# Patient Record
Sex: FEMALE | Race: WHITE | Employment: OTHER | URBAN - METROPOLITAN AREA
[De-identification: names, ages, dates, MRNs, and addresses within clinical notes are randomized per-mention and may not be internally consistent; named-entity substitution may affect disease eponyms.]

---

## 2021-04-08 DIAGNOSIS — Z23 ENCOUNTER FOR IMMUNIZATION: ICD-10-CM

## 2021-11-29 ENCOUNTER — OFFICE VISIT (OUTPATIENT)
Dept: PHYSICAL THERAPY | Facility: CLINIC | Age: 66
End: 2021-11-29
Payer: MEDICARE

## 2021-11-29 DIAGNOSIS — M17.12 LOCALIZED OSTEOARTHRITIS OF LEFT KNEE: Primary | ICD-10-CM

## 2021-11-29 PROCEDURE — 97161 PT EVAL LOW COMPLEX 20 MIN: CPT | Performed by: PHYSICAL THERAPIST

## 2021-12-13 ENCOUNTER — OFFICE VISIT (OUTPATIENT)
Dept: PHYSICAL THERAPY | Facility: CLINIC | Age: 66
End: 2021-12-13
Payer: MEDICARE

## 2021-12-13 DIAGNOSIS — M17.12 LOCALIZED OSTEOARTHRITIS OF LEFT KNEE: Primary | ICD-10-CM

## 2021-12-13 PROCEDURE — 97110 THERAPEUTIC EXERCISES: CPT | Performed by: PHYSICAL THERAPIST

## 2021-12-13 PROCEDURE — 97140 MANUAL THERAPY 1/> REGIONS: CPT | Performed by: PHYSICAL THERAPIST

## 2022-03-02 ENCOUNTER — OFFICE VISIT (OUTPATIENT)
Dept: PHYSICAL THERAPY | Facility: CLINIC | Age: 67
End: 2022-03-02
Payer: MEDICARE

## 2022-03-02 DIAGNOSIS — S76.312D HAMSTRING STRAIN, LEFT, SUBSEQUENT ENCOUNTER: Primary | ICD-10-CM

## 2022-03-02 PROCEDURE — 97140 MANUAL THERAPY 1/> REGIONS: CPT | Performed by: PHYSICAL THERAPIST

## 2022-03-02 PROCEDURE — 97161 PT EVAL LOW COMPLEX 20 MIN: CPT | Performed by: PHYSICAL THERAPIST

## 2022-03-02 NOTE — PROGRESS NOTES
PT Evaluation     Today's date: 3/2/2022  Patient name: Lakesha Julian  : 1955  MRN: [id number]  Referring provider: Roetta Fabry, PT  Dx:   Encounter Diagnosis     ICD-10-CM    1  Hamstring strain, left, subsequent encounter  S76 640D                   Assessment  Assessment details: Lakesha Julian is a 75 y o  female presenting to outpatient physical therapy at [place] [address] with complaints of L posterior knee pain  She presents with decreased endrange L knee ext ROM, decreased L H/S > calf flexibility, decreased tolerance to activity and decreased functional mobility due to a L hamstring strain that limits her from running  She is getting ready for a half marathon in [place] on 22  She would benefit from skilled PT services in order to address these deficits and reach maximum level of function with pain free running  She was self referred via direct access  She should only need a few PT sessions to resolve symptoms  Thank you for the referral!  Impairments: abnormal or restricted ROM, activity intolerance, impaired physical strength, lacks appropriate home exercise program and pain with function  Barriers to therapy: None  Understanding of Dx/Px/POC: excellent  Goals  ST  Independent with HEP in 2 weeks  2  Increase L H/S and gastroc flexibility to WNL in 3 weeks     LT  Achieve FOTO score of 85/100 in 4 weeks   2    Able to run 8 miles without L knee pain in 4 weeks      Plan  Patient would benefit from: skilled PT and PT eval  Planned modality interventions: cryotherapy  Other planned modality interventions: laser  Planned therapy interventions: ADL retraining, flexibility, functional ROM exercises, home exercise program, joint mobilization, manual therapy, strengthening, stretching, therapeutic activities and therapeutic exercise  Frequency: 2x week  Duration in weeks: 4  Plan of Care beginning date: 3/2/2022  Plan of Care expiration date: 2022  Treatment plan discussed with: patient        Subjective Evaluation    History of Present Illness  Mechanism of injury: Pt reports having anterior L anterior knee pain that started in 2021  She had PT 2x and pain resolved  She normally runs 5 days/week between 3-8 miles  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  Added speed and hill work in on 22 as she was getting ready for a half marathon on 22 in [facility] [place] [facility]   2 days later had severe posterior knee pain  Tried running again on 22 with some pain, but OK the following day  Wants to avoid being limited in her running going forward  Not a recurrent problem   Quality of life: excellent    Pain  Current pain ratin  At best pain ratin  At worst pain ratin  Quality: sharp and tight  Relieving factors: rest  Aggravating factors: running  Progression: worsening    Social Support  Steps to enter house: yes  Stairs in house: yes   Lives in: [facility]  Lives with: spouse    Employment status: not working    Diagnostic Tests  No diagnostic tests performed  Treatments  Previous treatment: physical therapy  Patient Goals  Patient goals for therapy: return to sport/leisure activities, decreased pain, increased motion and increased strength          Objective     Observations   Left Knee   Negative for effusion  Palpation     Additional Palpation Details  Mod tightness L H/S, min L calf, none L rectus femoris  Tenderness   Left Knee   Tenderness in the lateral joint line and medial joint line  No tenderness in the fibular head, ITB, patellar tendon and quadriceps tendon       Neurological Testing     Sensation     Knee   Left Knee   Intact: light touch    Right Knee   Intact: light touch     Active Range of Motion   Left Hip   Normal active range of motion    Right Hip   Normal active range of motion  Left Knee   Flexion: WFL  Extension: -6 degrees   Extensor la degrees     Right Knee   Normal active range of motion    Passive Range of Motion Left Knee   Normal passive range of motion    Right Knee   Normal passive range of motion    Mobility   Patellar Mobility:   Left Knee   WFL: lateral, superior and inferior  Hypomobile: left medial    Strength/Myotome Testing     Left Hip   Normal muscle strength    Right Hip   Normal muscle strength    Left Knee   Flexion: 5  Extension: 4+    Right Knee   Normal strength    Tests     Left Knee   Negative Apley's compression, lateral Rg, medial Rg, valgus stress test at 0 degrees and varus stress test at 0 degrees  Ambulation     Observational Gait   Gait: within functional limits     General Comments:      Knee Comments  Excellent B LE balance  POC EXPIRES On:  4/1/22  PRECAUTIONS:  None  CO-MORBIDITES:  None  PERSONAL FACTORS:  Runs 5 days/week 3-8 miles/day  Half marathon on 4/30/22        Manuals HEP 3/2           Graston L distal H/S - upper calf prone  8'           L H/S stretching   2'                                     Neuro Re-Ed                                                                                                Ther Ex    Strap L H/S stretch supine 3/2 20" 3                                                                                                      Ther Activity                              Gait Training                              Modalities

## 2022-03-02 NOTE — LETTER
2022    Priti Peck MD  [address] [address]    Patient: Bernadine Pennington   YOB: 1955   Date of Visit: 3/2/2022     Encounter Diagnosis     ICD-10-CM    1  Hamstring strain, left, subsequent encounter  G04 [id number]        Dear Dr Ascencio Arms: Thank you for your recent referral of Preeti   Please review the attached evaluation summary from Preeti's recent visit  Please verify that you agree with the plan of care by signing the attached order  If you have any questions or concerns, please do not hesitate to call  I sincerely appreciate the opportunity to share in the care of one of your patients and hope to have another opportunity to work with you in the near future  Sincerely,    Shobha Loza, PT      Referring Provider:      I certify that I have read the below Plan of Care and certify the need for these services furnished under this plan of treatment while under my care  Priti Peck MD  [address] [facility] [address]  Via Fax: [phone]          PT Evaluation     Today's date: 3/2/2022  Patient name: Bernadine Pennington  : 1955  MRN: [id number]  Referring provider: Caio Zamora, PT  Dx:   Encounter Diagnosis     ICD-10-CM    1  Hamstring strain, left, subsequent encounter  S76 312D                   Assessment  Assessment details: Bernadine Pennington is a 75 y o  female presenting to outpatient physical therapy at [place] [address] with complaints of L posterior knee pain  She presents with decreased endrange L knee ext ROM, decreased L H/S > calf flexibility, decreased tolerance to activity and decreased functional mobility due to a L hamstring strain that limits her from running  She is getting ready for a half marathon in [place] on 22  She would benefit from skilled PT services in order to address these deficits and reach maximum level of function with pain free running  She was self referred via direct access    She should only need a few PT sessions to resolve symptoms  Thank you for the referral!  Impairments: abnormal or restricted ROM, activity intolerance, impaired physical strength, lacks appropriate home exercise program and pain with function  Barriers to therapy: None  Understanding of Dx/Px/POC: excellent  Goals  ST  Independent with HEP in 2 weeks  2  Increase L H/S and gastroc flexibility to WNL in 3 weeks     LT  Achieve FOTO score of 85/100 in 4 weeks   2  Able to run 8 miles without L knee pain in 4 weeks      Plan  Patient would benefit from: skilled PT and PT eval  Planned modality interventions: cryotherapy  Other planned modality interventions: laser  Planned therapy interventions: ADL retraining, flexibility, functional ROM exercises, home exercise program, joint mobilization, manual therapy, strengthening, stretching, therapeutic activities and therapeutic exercise  Frequency: 2x week  Duration in weeks: 4  Plan of Care beginning date: 3/2/2022  Plan of Care expiration date: 2022  Treatment plan discussed with: patient        Subjective Evaluation    History of Present Illness  Mechanism of injury: Pt reports having anterior L anterior knee pain that started in 2021  She had PT 2x and pain resolved  She normally runs 5 days/week between 3-8 miles  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  Added speed and hill work in on 22 as she was getting ready for a half marathon on 22 in [place]   2 days later had severe posterior knee pain  Tried running again on 22 with some pain, but OK the following day  Wants to avoid being limited in her running going forward              Not a recurrent problem   Quality of life: excellent    Pain  Current pain ratin  At best pain ratin  At worst pain ratin  Quality: sharp and tight  Relieving factors: rest  Aggravating factors: running  Progression: worsening    Social Support  Steps to enter house: yes  Stairs in house: yes   Lives in: one-story house  Lives with: spouse    Employment status: not working    Diagnostic Tests  No diagnostic tests performed  Treatments  Previous treatment: physical therapy  Patient Goals  Patient goals for therapy: return to sport/leisure activities, decreased pain, increased motion and increased strength          Objective     Observations   Left Knee   Negative for effusion  Palpation     Additional Palpation Details  Mod tightness L H/S, min L calf, none L rectus femoris  Tenderness   Left Knee   Tenderness in the lateral joint line and medial joint line  No tenderness in the fibular head, ITB, patellar tendon and quadriceps tendon  Neurological Testing     Sensation     Knee   Left Knee   Intact: light touch    Right Knee   Intact: light touch     Active Range of Motion   Left Hip   Normal active range of motion    Right Hip   Normal active range of motion  Left Knee   Flexion: WFL  Extension: -6 degrees   Extensor la degrees     Right Knee   Normal active range of motion    Passive Range of Motion   Left Knee   Normal passive range of motion    Right Knee   Normal passive range of motion    Mobility   Patellar Mobility:   Left Knee   WFL: lateral, superior and inferior  Hypomobile: left medial    Strength/Myotome Testing     Left Hip   Normal muscle strength    Right Hip   Normal muscle strength    Left Knee   Flexion: 5  Extension: 4+    Right Knee   Normal strength    Tests     Left Knee   Negative Apley's compression, lateral Rg, medial Rg, valgus stress test at 0 degrees and varus stress test at 0 degrees  Ambulation     Observational Gait   Gait: within functional limits     General Comments:      Knee Comments  Excellent B LE balance  POC EXPIRES On:  22  PRECAUTIONS:  None  CO-MORBIDITES:  None  PERSONAL FACTORS:  Runs 5 days/week 3-8 miles/day  Half marathon on 22        Manuals HEP 3/2           Graston L distal H/S - upper calf prone  8'           L H/S stretching   2'                                     Neuro Re-Ed                                                                                                Ther Ex    Christina L H/S stretch supine 3/2 20" 3                                                                                                      Ther Activity                              Gait Training                              Modalities

## 2022-03-07 ENCOUNTER — OFFICE VISIT (OUTPATIENT)
Dept: PHYSICAL THERAPY | Facility: CLINIC | Age: 67
End: 2022-03-07
Payer: MEDICARE

## 2022-03-07 DIAGNOSIS — S76.312D HAMSTRING STRAIN, LEFT, SUBSEQUENT ENCOUNTER: Primary | ICD-10-CM

## 2022-03-07 PROCEDURE — 97140 MANUAL THERAPY 1/> REGIONS: CPT | Performed by: PHYSICAL THERAPIST

## 2022-03-07 NOTE — PROGRESS NOTES
Daily Note     Today's date: 3/7/2022  Patient name: Uzma Gonzalez  : 1955  MRN: [id number]  Referring provider: Gail Lance, PT  Dx:   Encounter Diagnosis     ICD-10-CM    1  Hamstring strain, left, subsequent encounter  S76 023D                   Subjective:  Pt reports being able to run 5 25 miles after last PT session, but then L posterior knee pain started  Still sore  Objective:  See treatment diary below      Assessment:  Pt presented to outpatient physical therapy at [place] [address] with complaints of L posterior knee pain  She presented with decreased endrange L knee ext ROM, decreased L H/S > calf flexibility, decreased tolerance to activity and decreased functional mobility due to a L hamstring strain that limits her from running  She is getting ready for a half marathon in [place] on 22  She will continue to benefit from skilled PT services in order to address these deficits and reach maximum level of function with pain free running  She was self referred via direct access  She should only need a few PT sessions to resolve symptoms  Much less tightness in upper L calf post tx today  Plan:  Add laser on 3/11/22  POC EXPIRES On:  22  PRECAUTIONS:  None  CO-MORBIDITES:  None  PERSONAL FACTORS:  Runs 5 days/week 3-8 miles/day  Half marathon on 22        Manuals HEP 3/2 3/7          Graston L distal H/S - upper calf prone  8' 15'          L H/S stretching   2' 5'                                    Neuro Re-Ed                                                                                                Ther Ex    Strap L H/S stretch supine 3/2 20" 3 20" 5                                                                                                     Ther Activity                              Gait Training                              Modalities

## 2022-03-11 ENCOUNTER — OFFICE VISIT (OUTPATIENT)
Dept: PHYSICAL THERAPY | Facility: CLINIC | Age: 67
End: 2022-03-11
Payer: MEDICARE

## 2022-03-11 DIAGNOSIS — S76.312D HAMSTRING STRAIN, LEFT, SUBSEQUENT ENCOUNTER: Primary | ICD-10-CM

## 2022-03-11 PROCEDURE — 97140 MANUAL THERAPY 1/> REGIONS: CPT | Performed by: PHYSICAL THERAPIST

## 2022-03-17 ENCOUNTER — TELEPHONE (OUTPATIENT)
Dept: OBGYN CLINIC | Facility: OTHER | Age: 67
End: 2022-03-17

## 2023-01-24 ENCOUNTER — TELEPHONE (OUTPATIENT)
Dept: OBGYN CLINIC | Facility: HOSPITAL | Age: 68
End: 2023-01-24

## 2023-02-13 ENCOUNTER — OFFICE VISIT (OUTPATIENT)
Dept: OBGYN CLINIC | Facility: CLINIC | Age: 68
End: 2023-02-13

## 2023-02-13 ENCOUNTER — APPOINTMENT (OUTPATIENT)
Dept: RADIOLOGY | Facility: CLINIC | Age: 68
End: 2023-02-13

## 2023-02-13 VITALS
DIASTOLIC BLOOD PRESSURE: 67 MMHG | WEIGHT: 120 LBS | BODY MASS INDEX: 21.26 KG/M2 | HEART RATE: 70 BPM | HEIGHT: 63 IN | SYSTOLIC BLOOD PRESSURE: 117 MMHG

## 2023-02-13 DIAGNOSIS — M79.641 RIGHT HAND PAIN: ICD-10-CM

## 2023-02-13 DIAGNOSIS — M65.311 TRIGGER FINGER OF RIGHT THUMB: Primary | ICD-10-CM

## 2023-02-13 RX ORDER — MULTIVIT-MIN/IRON/FOLIC ACID/K 18-600-40
CAPSULE ORAL
COMMUNITY
Start: 2021-11-01

## 2023-02-13 RX ORDER — CHLORAL HYDRATE 500 MG
CAPSULE ORAL EVERY 24 HOURS
COMMUNITY

## 2023-02-13 RX ORDER — TURMERIC ROOT EXTRACT 500 MG
TABLET ORAL
COMMUNITY

## 2023-02-13 NOTE — PROGRESS NOTES
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 OA  Right Thumb Trigger    Repeat X-ray next visit: None    No follow-ups on file  Patient Instructions   Trigger finger (stenosing flexor tenosynovitis) is a catching or clicking of the finger (triggering) due to the formation of a soft tissue knot in the tendinous cable that moves your finger  When this knot of the tendon sheath passes through the A1 pulley at the bottom palmar side of your hand it becomes stock momentarily resulting in clicking or locking of the finger  There is no agreed upon cause for the formation of the knot but risk factors do include repetitive overuse and possibly prior trauma  Treatment involves avoidance of activities that trigger the finger, splinting with slight flexion at the knuckle (MCP joint) for 3-6 weeks or tessy taping  Oral anti-inflammatories also can help reduce swelling and triggering and are generally used for 2-4 weeks  If there is no relief with conservative measures or if there is severe locking then we will consider steroid injection once every 6 weeks for a maximum of 3 injections if there is no relief by 50%  Many patients, however, have great relief with only one injection and many (50%) have relief even up to 1 year  Surgery is considered if no relief after injection  (EDA Huertas 2018)  Educated risks of mixing NSAIDS ( (non-steroidal anti-inflammatory pills including advil, ibuprofen, motrin, meloxicam, celecoxib, aleve, naproxen, and aspirin containing products) with each other or with steroids (such as prednisone, medrol)   Explained risks of mixing these medications including stomach ulcer, severe internal bleeding, and kidney failure  Instructed not to take NSAIDS if have history of stomach ulcers, kidney issues, or uncontrolled hypertension  Instructed patient to use only one brand as prescribed  For naproxen, a maximum of 500 mg per dose every 12 hours and no more than two doses or 1,000mg per day  For Ibuprofen, a maximum of 800 mg per dose every 6 hours but no more than 3 doses or 2,400 mg per day  Never take these medications together  Never take these medications the same day  For severe pain and only if you have no liver problems, you may add Tylenol (also known as acetaminophen) maximum of 1,000  Mg per dose every 6 hours but no more 3 doses or 3,000 mg per day  Patient expressed understanding and agreed to plan                     __________________________________________________________________________    HISTORY OF PRESENT ILLNESS:  Evaluation of bilateral hand and wrist pain worse in the left hand however patient had a concern about a possible contraction of her right hand thumb which lasted less than a day where patient's thumb was flexed and she could not extend it  She was seen by her primary care physician who recommended evaluation for hand due to concerns over the contracture  Today she tells me that her pain overall is mild intensity achy sore tolerable worse with gardening  Denies any injury  Thumb triggered about 3 weeks ago  None since and none before  No pain of thumb currently  Points to 1st cmc as source of pain for her hands bilateral          Review of Systems      Following history reviewed and update:    No past medical history on file  No past surgical history on file  Social History   Social History     Substance and Sexual Activity   Alcohol Use None     Social History     Substance and Sexual Activity   Drug Use Not on file     Social History     Tobacco Use   Smoking Status Not on file   Smokeless Tobacco Not on file     No family history on file    No Known Allergies       Physical Exam  /67 (BP Location: Left arm, Patient Position: Sitting, Cuff Size: Adult)   Pulse 70   Ht 5' 3" (1 6 m)   Wt 54 4 kg (120 lb)   BMI 21 26 kg/m²     Constitutional:  see vital signs  Gen: well-developed, normocephalic/atraumatic, well-groomed  Eyes: No inflammation or discharge of conjunctiva or lids; sclera clear   Pharynx: no inflammation, lesion, or mass of lips  Neck: supple, no masses, non-distended  MSK: no inflammation, lesion, mass, or clubbing of nails and digits except for other than mentioned below  SKIN: no visible rashes or skin lesions  Pulmonary/Chest: Effort normal  No respiratory distress     NEURO: cranial nerves grossly intact  PSYCH:  Alert and oriented to person, place, and time; recent and remote memory intact; mood normal, no depression, anxiety, or agitation, judgment and insight good and intact     Ortho Exam  Left Elbow:  rom full  nontender  no laxity of joint  Cubital tunnel Tinel's test:  Distal Biceps Hook test:    Left Wrist  rom full  nontender  no laxity of joint; druj stable  Carpal tunnel compression test:  Phalen's test:  Tinel's carpal tunnel test:    Left Hand  no erythema  Swelling: none  Tenderness: : +tenderness left thumb 1st cmc  rom fingers mcp, pip, dip intact without pain  No digital scissoring or deviation of fingers  no extensor lag  no rotation of fingers  no joint laxity  strenght flexion and extension mcp, pip, dip 5/5  sensation intact  capillary refill intact   Froment sign:  normal  OK sign:  Normal  Thumb extension:  5/5    Right Elbow:  rom full  nontender  no laxity of joint  Cubital tunnel Tinel's test:  Distal Biceps Hook test:    Right Wrist  rom full  nontender  no laxity of joint; druj stable  Carpal tunnel compression test:  Phalen's test:  Tinel's carpal tunnel test:    Right Hand  no erythema  Swelling: none  Tenderness: none  rom fingers mcp, pip, dip intact without pain  No digital scissoring or deviation of fingers  no extensor lag  no rotation of fingers  no joint laxity  strenght flexion and extension mcp, pip, dip 5/5  sensation intact  capillary refill intact   Froment sign:  normal  OK sign:  Normal  Thumb extension:  5/5    __________________________________________________________________________  Procedures

## 2023-08-09 NOTE — PROGRESS NOTES
[address] [place] [address] REFERRAL      Date: 8/9/2023    Patient name: Anjelica Olson    YOB: 2001    Estimated Date of Delivery: 10/20/23      /74 (BP Location: Right arm, Patient Position: Sitting, Cuff Size: Standard)   Pulse 82   Ht 5' 7" (1.702 m)   Wt 95.6 kg (210 lb 12.8 oz)   LMP 01/13/2023 (Exact Date)   BMI 33.02 kg/m²         Thank you,  Luigi Ambrocio, [address] [place] [address] locations:   1. [facility]       [address] [facility]       [address]       Phone: [phone]       Fax: [phone]     2.  [address] [facility]       [address]       Phone: [phone]       Fax: [phone] Daily Note + D/C    Today's date: 3/11/2022  Patient name: Jatinder Armsa  : 1955  MRN: [id number]  Referring provider: Alvie Dubin, PT  Dx:   Encounter Diagnosis     ICD-10-CM    1  Hamstring strain, left, subsequent encounter  S76 312D      Addendum 3/31/22:  Pt D/C to HEP  Most goals met  Subjective:  Pt reports being able to run pain free yesterday  Very excited  Objective:  See treatment diary below      Assessment:  Pt presented to outpatient physical therapy at [facility] with complaints of L posterior knee pain  She presented with decreased endrange L knee ext ROM, decreased L H/S > calf flexibility, decreased tolerance to activity and decreased functional mobility due to a L hamstring strain that limits her from running  She is getting ready for a half marathon in [place] on 22  She will continue to benefit from skilled PT services in order to address these deficits and reach maximum level of function with pain free running  She was self referred via direct access  She should only need a few more PT sessions to resolve symptoms  No tightness for the first time today after manual tx  Pt is able to run now without L LE pain  Plan:  Possible D/C on 3/18/22  Continue laser on 3/11/22  POC EXPIRES On:  22  PRECAUTIONS:  None  CO-MORBIDITES:  None  PERSONAL FACTORS:  Runs 5 days/week 3-8 miles/day  Half marathon on 22        Manuals HEP 3/2 3/7 3/11         Graston L distal H/S - upper calf prone  8' 15' 15'         L H/S stretching   2' 5' 5'                                   Neuro Re-Ed                                                                                                Ther Ex    Strap L H/S stretch supine 3/2 20" 3 20" 5 20" 5                                                                                                    Ther Activity                              Gait Training                              Modalities    Laser posterior L knee 5'